# Patient Record
Sex: MALE | Race: OTHER | HISPANIC OR LATINO | URBAN - METROPOLITAN AREA
[De-identification: names, ages, dates, MRNs, and addresses within clinical notes are randomized per-mention and may not be internally consistent; named-entity substitution may affect disease eponyms.]

---

## 2018-04-11 ENCOUNTER — EMERGENCY (EMERGENCY)
Facility: HOSPITAL | Age: 23
LOS: 1 days | Discharge: HOME | End: 2018-04-11
Attending: EMERGENCY MEDICINE | Admitting: EMERGENCY MEDICINE
Payer: COMMERCIAL

## 2018-04-11 VITALS
DIASTOLIC BLOOD PRESSURE: 78 MMHG | OXYGEN SATURATION: 100 % | HEART RATE: 79 BPM | TEMPERATURE: 98 F | SYSTOLIC BLOOD PRESSURE: 143 MMHG | RESPIRATION RATE: 15 BRPM

## 2018-04-11 PROBLEM — Z00.00 ENCOUNTER FOR PREVENTIVE HEALTH EXAMINATION: Status: ACTIVE | Noted: 2018-04-11

## 2018-04-11 PROCEDURE — 99282 EMERGENCY DEPT VISIT SF MDM: CPT | Mod: 25

## 2018-04-11 NOTE — ED PROVIDER NOTE - MEDICAL DECISION MAKING DETAILS
22 year old male with left foot pain. The area was explored with no signs of foreign bodies. The patient is able to ambulate. There is mild tenderness to palpation of the plantar aspect of the left foot. Will d/c home with the recommendation to elevate the leg, use orthotic in shoes, ice to decrease inflammation, and follow up with primary care.

## 2018-04-11 NOTE — ED ADULT TRIAGE NOTE - CHIEF COMPLAINT QUOTE
"I stepped on something and I think it's in my foot." 6 days ago pt walking around [place], states stepped on something sharp that went though his shoe and into his left foot, presents today for continued pain 2/10

## 2018-04-11 NOTE — ED PROVIDER NOTE - OBJECTIVE STATEMENT
22 year old male with no significant PMHx, presents with left foot pain x 6 days. The patient states that he first noticed his foot pain on Friday of last week, stating that he went to a club on Thursday and possibly stepped on something. His pain is localized to the plantar aspect of his foot. Patient is able to ambulate with mild pain. He notes that he was wearing sneakers at the time. 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 He denies fever, chills, nausea, vomiting, weakness, numbness, tingling, or any other complaint.

## 2019-10-28 ENCOUNTER — APPOINTMENT (OUTPATIENT)
Dept: ORTHOPEDIC SURGERY | Facility: CLINIC | Age: 24
End: 2019-10-28

## 2019-11-01 ENCOUNTER — APPOINTMENT (OUTPATIENT)
Dept: ORTHOPEDIC SURGERY | Facility: CLINIC | Age: 24
End: 2019-11-01
Payer: MEDICAID

## 2019-11-01 VITALS — WEIGHT: 240 LBS | BODY MASS INDEX: 30.8 KG/M2 | HEIGHT: 74 IN

## 2019-11-01 DIAGNOSIS — Z78.9 OTHER SPECIFIED HEALTH STATUS: ICD-10-CM

## 2019-11-01 PROCEDURE — 99203 OFFICE O/P NEW LOW 30 MIN: CPT

## 2019-11-01 PROCEDURE — 73564 X-RAY EXAM KNEE 4 OR MORE: CPT | Mod: RT

## 2019-11-01 NOTE — HISTORY OF PRESENT ILLNESS
[de-identified] : CC RIGHT knee\par \par HPI  MARK WILDE  , 24 year old M LHD presents with acute onset of 4 months of Constant pain in the Medial RIGHT knee with hyperextension injury playing kickball. The pain is better and rated a 3# out of 10, described as Aching without radiation. Rest makes the pain better and stairs sit-stand  makes the pain worse. The patient reports associated symptoms of Clicking/Grinding/Locking/Instability/Swelling.  The patient denies pain at night affecting sleep, and denies similar pain previously.\par \par The patient has tried the following treatments:\par Activity modification	+ \par Ice/Compression  	                +\par Braces    		                -\par Nsaids    		                + \par Physical Therapy  	                -\par Cortisone Injection	                -\par Visco Injection		-\par Arthroscopy		-\par \par Review of Systems is positive for the above musculoskeletal symptoms and is otherwise non-contributory for general, constitutional, psychiatric, neurologic, HEENT, cardiac, respiratory, gastrointestinal, reproductive, lymphatic, and dermatologic complaints.\par

## 2019-11-01 NOTE — DISCUSSION/SUMMARY
[de-identified] : Right knee internal derangement suspect possible medial meniscus tear and possible ACL strain/tear\par \par I discussed the findings and history exam and radiology the patient.\par \par Patient currently has [facility] Medicaid without outpatient coverage. He recently was covered under a [facility] plus he believes that he is not sure he does not have a card yet\par \par Given this I recommend the patient continued knee exercises\par \par Anti-inflammatory pain medicine as needed\par \par Patient is given contact information for the orthopedic clinic where Medicaid patient can receive MRIs and surgery.\par \par The patient is covered under an HMO insurance the patient will follow with me for further evaluation for consideration of advance imaging

## 2019-11-01 NOTE — PHYSICAL EXAM
[de-identified] : \par Physical Examination\par General: well nourished, in no acute distress, alert and oriented to person, place and time\par Psychiatric: normal mood and affect, no abnormal movements or speech patterns\par Eyes: vision intact Without glasses\par Throat: no thyromegaly\par Lymph: no enlarged nodes, no lymphedema in extremity\par Respiratory: no wheezing, no shortness of breath with ambulation\par Cardiac: no cardiac leg swelling, 2+ peripheral pulses\par Neurology: normal gross sensation in extremities to light touch\par Abdomen: soft, non-tender, tympanic, no masses\par \par Musculoskeletal Examination\par Ambulation	- antalgic gait, - assistive devices\par \par Knee			Right			Left\par General\par      Swelling/Deformity	normal			normal	\par      Skin			normal			normal\par      Erythema		-			-\par      Standing Alignment	neutral			neutral\par      Effusion		small moderate			none\par Range of Motion\par      Hip			full painless ROM		full painless ROM\par      Knee Flexion		130			130\par      Knee Extension	0			0\par Patella\par      J Sign		-			-\par      Quad Medial/Lateral	1/1 1/1\par      Apprehension		-			-\par      Rashel's		-			-\par      Grind Sign		-			-\par      Crepitus		-			-\par Palpation\par      Medial Joint Line	+			-\par      Medial Fem Condyle	-			-\par      Lateral Joint Line	-			-\par      Quad Tendon		-			-\par      Patella Tendon	-			-\par      Medial Patella		-			-\par      Lateral Patella 	-			-\par      Posterior Knee	-			-\par Ligamentous\par      Varus @ 0° / 30°	-/-			-/-\par      Valgus @ 0° / 30°	-/-			-/-\par      Lachman		2A			-\par      Pivot Shift		-limited			-\par      Anterior Drawer	-			-\par      Posterior Drawer	-			-\par Meniscus\par      Jennie		+ medial pain			-\par      Flexion Pinch		+Anterior			-\par Strength Examination/Atrophy\par      Hip Flexors 		5+			5+\par      Quadriceps		5+			5+\par      Hamstring		5+			5+\par      Tibialis Anterior	5+			5+\par      Achilles/Soleus	5+			5+\par Sensation\par      Deep Peroneal	normal			normal\par      Superficial Peroneal 	normal			normal\par      Sural  		normal			normal\par      Posterior Tibial 	normal			normal\par      Saphneous 		normal			normal\par Pulses\par      DP			2+			2+\par \par  [de-identified] : 5 views of the affected Right knee (standing AP, flexing standing AP, 30degree flexed lateral, 0degree lateral, sunrise view)\par were ordered, obtained and evaluated by myself today and\par demonstrate:\par There is no narrowing\par Trace osteophytic lipping\par Small suprapatellar effusion\par no patellofemoral joint space loss without evidence of tilt [or] subluxation on sunrise view\par Normal soft tissue density\par Otherwise normal osseous bone structure without fracture or dislocation

## 2019-12-04 ENCOUNTER — APPOINTMENT (OUTPATIENT)
Dept: ORTHOPEDIC SURGERY | Facility: CLINIC | Age: 24
End: 2019-12-04
Payer: MEDICAID

## 2019-12-04 PROCEDURE — 99214 OFFICE O/P EST MOD 30 MIN: CPT

## 2019-12-04 RX ORDER — DICLOFENAC SODIUM 50 MG/1
50 TABLET, DELAYED RELEASE ORAL
Qty: 60 | Refills: 1 | Status: ACTIVE | COMMUNITY
Start: 2019-12-04 | End: 1900-01-01

## 2019-12-04 NOTE — DISCUSSION/SUMMARY
[de-identified] : Right knee internal derangement suspect possible medial meniscus tear and possible ACL strain/tear\par Right knee patellofemoral syndrome\par \par I discussed my findings on history, exam and radiology.\par \par I reviewed the anatomy and function of the periarticular muscles and tendons, patella, ligaments, menisci and cartilage of the knee using models, images and diagrams. Given the current findings for the patient, I recommend proceeding with advanced imaging to better characterize and diagnose the problem to aid patient care, management and treatment guidance as I suspect an internal injury to the knee not diagnosed on non-diagnostic plain radiographs causing the patients symptoms and physical examination to help provide surgical management planning.\par \par Therefore given the patients continued symptoms despite 6 weeks of home exercises, anti-inflammatory medication, activity modification with continued pain affecting ADLs and inability to do activities limiting quality of life as well as locking and instability significant for patient falls potentially placing the patient at increased risk for exacerbating the injury or causing further injury to the knee, an examination and history consistent with injury to an internal knee derangement and a non-diagnostic radiograph I recommend obtaining an MRI to assess the knee's internal structures for preoperative planning. I discussed with the patient that I am ordering an MRI to assess the soft tissue structures in the joint such as ligaments and tendons, as well as to assess the cartilage and menisci as well as for any bony edema. The test will need insurance approval and will take place at a [facility] facility or outside facility. If the patient elects to obtain the MRI from an outside facility, the patient understands they have been instructed to bring in both the final radiologist read and a CD/DVD with the MRI images to allow review of the imaging test by myself during the follow up visit. I do not recommend obtaining an open standing MRI as the quality of the images is subpar and makes it difficult to diagnose intra-articular derangements and guide care discussion and decision making.\par The patient was prescribed Diclofenac PO non-steroidal anti-inflammatory medication. 50mg tablets twice daily to be taken for at least 1-2 weeks in a row and then PRN afterwards. Risks and benefits were discussed and include but not limited to renal damage and GI ulceration and bleeding.  They were advised to take with food to limit stomach upset as well as warned to stop the medication if worsening gastric pain or dizziness or other side effects. Also to immediately stop the medication and seek appropriate medical attention if any severe stomach ache, gastritis, black/red vomit, black/red stools or any other medical concern.\par \par The patient verifies their understanding the the visit, diagnosis and plan. They agree with the treatment plan and will contact the office with any questions or problems.\par \par FU after MRI is obtained

## 2019-12-04 NOTE — PHYSICAL EXAM
[de-identified] : \par Physical Examination\par General: well nourished, in no acute distress, alert and oriented to person, place and time\par Psychiatric: normal mood and affect, no abnormal movements or speech patterns\par Eyes: vision intact Without glasses\par Throat: no thyromegaly\par Lymph: no enlarged nodes, no lymphedema in extremity\par Respiratory: no wheezing, no shortness of breath with ambulation\par Cardiac: no cardiac leg swelling, 2+ peripheral pulses\par Neurology: normal gross sensation in extremities to light touch\par Abdomen: soft, non-tender, tympanic, no masses\par \par Musculoskeletal Examination\par Ambulation	- antalgic gait, - assistive devices\par \par Knee			Right			Left\par General\par      Swelling/Deformity	normal			normal	\par      Skin			normal			normal\par      Erythema		-			-\par      Standing Alignment	neutral			neutral\par      Effusion		trace			none\par Range of Motion\par      Hip			full painless ROM		full painless ROM\par      Knee Flexion		130			130\par      Knee Extension	0			0\par Patella\par      J Sign		-			-\par      Quad Medial/Lateral	1/1 1/1\par      Apprehension		-			-\par      Rashel's		-			-\par      Grind Sign		-			-\par      Crepitus		+			-\par Palpation\par      Medial Joint Line	+			-\par      Medial Fem Condyle	-			-\par      Lateral Joint Line	-			-\par      Quad Tendon		-			-\par      Patella Tendon	-			-\par      Medial Patella		-			-\par      Lateral Patella 	-			-\par      Posterior Knee	-			-\par Ligamentous\par      Varus @ 0° / 30°	-/-			-/-\par      Valgus @ 0° / 30°	-/-			-/-\par      Lachman		2A			-\par      Pivot Shift		-limited			-\par      Anterior Drawer	-			-\par      Posterior Drawer	-			-\par Meniscus\par      Jennie		+ medial pain			-\par      Flexion Pinch		+ Anterior			-\par Strength Examination/Atrophy\par      Hip Flexors 		5+			5+\par      Quadriceps		5+			5+\par      Hamstring		5+			5+\par      Tibialis Anterior	5+			5+\par      Achilles/Soleus	5+			5+\par Sensation\par      Deep Peroneal	normal			normal\par      Superficial Peroneal 	normal			normal\par      Sural  		normal			normal\par      Posterior Tibial 	normal			normal\par      Saphneous 		normal			normal\par Pulses\par      DP			2+			2+\par \par  [de-identified] : 5 views of the affected Right knee (standing AP, flexing standing AP, 30degree flexed lateral, 0degree lateral, sunrise view)\par 11-1-19\par demonstrate:\par There is no narrowing\par Trace osteophytic lipping\par Small suprapatellar effusion\par no patellofemoral joint space loss without evidence of tilt [or] subluxation on sunrise view\par Normal soft tissue density\par Otherwise normal osseous bone structure without fracture or dislocation

## 2019-12-04 NOTE — HISTORY OF PRESENT ILLNESS
[de-identified] : CC RIGHT knee\par \par HPI  MARK WILDE  , 24 year old M LHD presents for 6 weeks home exercises FU of acute onset of 4 months of Constant pain in the Medial RIGHT knee with hyperextension injury playing kickball. The pain is better and rated a 3# out of 10, described as Aching without radiation. Rest makes the pain better and stairs sit-stand  makes the pain worse. The patient reports associated symptoms of Clicking/Grinding/Locking/Instability/Swelling.  The patient denies pain at night affecting sleep, and denies similar pain previously.\par \par The patient has tried the following treatments:\par Activity modification	+ \par Ice/Compression  	                +\par Braces    		                -\par Nsaids    		                + \par Physical Therapy  	                6 weeks homeexercises\par Cortisone Injection	                -\par Visco Injection		-\par Arthroscopy		-\par \par still has pain wants mri\par \par Review of Systems is positive for the above musculoskeletal symptoms and is otherwise non-contributory for general, constitutional, psychiatric, neurologic, HEENT, cardiac, respiratory, gastrointestinal, reproductive, lymphatic, and dermatologic complaints.\par

## 2019-12-30 ENCOUNTER — APPOINTMENT (OUTPATIENT)
Dept: ORTHOPEDIC SURGERY | Facility: CLINIC | Age: 24
End: 2019-12-30

## 2020-01-03 ENCOUNTER — APPOINTMENT (OUTPATIENT)
Dept: ORTHOPEDIC SURGERY | Facility: CLINIC | Age: 25
End: 2020-01-03
Payer: MEDICAID

## 2020-01-03 DIAGNOSIS — S83.271A COMPLEX TEAR OF LATERAL MENISCUS, CURRENT INJURY, RIGHT KNEE, INITIAL ENCOUNTER: ICD-10-CM

## 2020-01-03 DIAGNOSIS — S83.241A OTHER TEAR OF MEDIAL MENISCUS, CURRENT INJURY, RIGHT KNEE, INITIAL ENCOUNTER: ICD-10-CM

## 2020-01-03 DIAGNOSIS — S83.511A SPRAIN OF ANTERIOR CRUCIATE LIGAMENT OF RIGHT KNEE, INITIAL ENCOUNTER: ICD-10-CM

## 2020-01-03 DIAGNOSIS — M95.8 OTHER SPECIFIED ACQUIRED DEFORMITIES OF MUSCULOSKELETAL SYSTEM: ICD-10-CM

## 2020-01-03 PROCEDURE — 99213 OFFICE O/P EST LOW 20 MIN: CPT

## 2020-01-03 NOTE — DISCUSSION/SUMMARY
[de-identified] : Right knee medial meniscus peripheral vertical meniscal capsular junction tear\par Right knee lateral femoral condyle 1 x 1.5 cm osteochondral lesion with subchondral cysts and bone marrow edema\par Right knee posterior horn lateral meniscus tear\par Right knee chronic low-grade ACL tear\par \par \par I discussed the nature of meniscal tears using models, diagrams and drawings as well as the mensci's function. The meniscus is a fibrocartilage that cushions and spreads the contact stresses between the femur and the tibia. It becomes less pliable and more prone to tearing with age. The torn meniscus can be painful. We discussed both the risks and benefits of both operative versus non-operative treatment. Non-operative treatment including activity modification, oral NSAIDS, therapy and corticosteroid injections can also be attempted. In patient failing non-operative conservative treatment, the definitive surgical treatment, depending upon manypatient factors, including but not limited to age, activity level, tear acuity, tear pattern, tissue quality, etc, is arthroscopic debridement versus repair. However, certain a certain subset of patients, they are candidates for a meniscal repair which should be done expediently to maximize reparability of the torn meniscus. There is a chance of progression of knee degenerative arthrosis with a meniscus tear due to the loss of function of the meniscus. Unfortunately there is frequently superimposed degenerative chondromalacia of the chondral surfaces in the knee. These symptoms are frequently not experienced preoperatively or are in addition to the meniscal symptoms. It is very difficult to differentiate the two clinically based on imaging studies, physical exam and history, therefore there are no guarantees as to the outcome, that ultimate improvement is largely based on the extent of degenerative chondromalacia present as well as the extent of meniscal pathology. The patient was instructed to avoid deep knee bends or squatting as this may exacerbate the knee's internal derangement.\par \par I discussed the significance of the significant lateral femoral condyle osteochondral lesion with evidence of chronic changes within the underlying. I discussed surgical management and nonoperative management. Given the patient's lack of symptoms at this moment I recommend the patient continue with nonoperative management.\par \par The patient is any pain I recommend immediate evaluation for possible surgical management. Did discuss possible prophylactic management at this time and this is not amenable.\par \par The patient verifies their understanding the the visit, diagnosis and plan. They agree with the treatment plan and will contact the office with any questions or problems.\par \par Follow up\par P.r.n.

## 2020-01-03 NOTE — HISTORY OF PRESENT ILLNESS
[de-identified] : CC RIGHT knee\par \par HPI  MARK WILDE  , 24 year old M LHD presents for MRI results FU of acute onset of 4 months of Constant pain in the Medial RIGHT knee with hyperextension injury playing kickball. The pain is better and rated a 3# out of 10, described as Aching without radiation. Rest makes the pain better and stairs sit-stand  makes the pain worse. The patient reports associated symptoms of Clicking/Grinding/Locking/Instability/Swelling.  The patient denies pain at night affecting sleep, and denies similar pain previously.\par \par The patient has tried the following treatments:\par Activity modification	+ \par Ice/Compression  	                +\par Braces    		                -\par Nsaids    		                + \par Physical Therapy  	                6 weeks homeexercises\par Cortisone Injection	                -\par Visco Injection		-\par Arthroscopy		-\par \par pain almost resolved, able to run without pain\par \par Review of Systems is positive for the above musculoskeletal symptoms and is otherwise non-contributory for general, constitutional, psychiatric, neurologic, HEENT, cardiac, respiratory, gastrointestinal, reproductive, lymphatic, and dermatologic complaints.\par

## 2020-01-03 NOTE — PHYSICAL EXAM
[de-identified] : \par Physical Examination\par General: well nourished, in no acute distress, alert and oriented to person, place and time\par Psychiatric: normal mood and affect, no abnormal movements or speech patterns\par Eyes: vision intact Without glasses\par Throat: no thyromegaly\par Lymph: no enlarged nodes, no lymphedema in extremity\par Respiratory: no wheezing, no shortness of breath with ambulation\par Cardiac: no cardiac leg swelling, 2+ peripheral pulses\par Neurology: normal gross sensation in extremities to light touch\par Abdomen: soft, non-tender, tympanic, no masses\par \par Musculoskeletal Examination\par Ambulation	- antalgic gait, - assistive devices\par \par Knee			Right			Left\par General\par      Swelling/Deformity	normal			normal	\par      Skin			normal			normal\par      Erythema		-			-\par      Standing Alignment	neutral			neutral\par      Effusion		trace			none\par Range of Motion\par      Hip			full painless ROM		full painless ROM\par      Knee Flexion		130			130\par      Knee Extension	0			0\par Patella\par      J Sign		-			-\par      Quad Medial/Lateral	1/1 1/1\par      Apprehension		-			-\par      Rashel's		-			-\par      Grind Sign		-			-\par      Crepitus		+			-\par Palpation\par      Medial Joint Line	+			-\par      Medial Fem Condyle	-			-\par      Lateral Joint Line	-			-\par      Quad Tendon		-			-\par      Patella Tendon	-			-\par      Medial Patella		-			-\par      Lateral Patella 	-			-\par      Posterior Knee	-			-\par Ligamentous\par      Varus @ 0° / 30°	-/-			-/-\par      Valgus @ 0° / 30°	-/-			-/-\par      Lachman		2A			-\par      Pivot Shift		-limited			-\par      Anterior Drawer	-			-\par      Posterior Drawer	-			-\par Meniscus\par      Jennie		+ mild medial pain			-\par      Flexion Pinch		+ Anterior			-\par Strength Examination/Atrophy\par      Hip Flexors 		5+			5+\par      Quadriceps		5+			5+\par      Hamstring		5+			5+\par      Tibialis Anterior	5+			5+\par      Achilles/Soleus	5+			5+\par Sensation\par      Deep Peroneal	normal			normal\par      Superficial Peroneal 	normal			normal\par      Sural  		normal			normal\par      Posterior Tibial 	normal			normal\par      Saphneous 		normal			normal\par Pulses\par      DP			2+			2+\par \par  [de-identified] : 5 views of the affected Right knee (standing AP, flexing standing AP, 30degree flexed lateral, 0degree lateral, sunrise view)\par 11-1-19\par demonstrate:\par There is no narrowing\par Trace osteophytic lipping\par Small suprapatellar effusion\par no patellofemoral joint space loss without evidence of tilt [or] subluxation on sunrise view\par Normal soft tissue density\par Otherwise normal osseous bone structure without fracture or dislocation\par \par \par MRI Right knee from [facility] on 12-6-19\par My impression of the images:\par Quality of the MRI is good\par Medial Meniscus peripheral vertical tear of the meniscus from the body to the posterior horn at the meniscal capsular junction\par Lateral Meniscus significant truncation of the posterior horn\par There is focal moderate to severe chondral loss in the lateral femoral condyle compartments\par There is bone marrow edema[/subchondral cysts] in the lateral femoral condyle compartments\par LCL is intact\par MCL is intact\par ACL is intact but attenuated\par PCL is intact \par Quadriceps Tendon is intact\par Patella Tendon is intact\par \par The Final Radiologist Impression:\par SOFT TISSUES: Unremarkable.\par BONE MARROW: No fracture or avascular necrosis.\par Menisci : Longitudinal vertical tear through the peripheral red zone of the posterior horn\par and body of the medial meniscus (sagittal 30, coronal 13). Radial tear posterior horn\par lateral meniscus (sagittal 12).\par ANTERIOR CRUCIATE LIGAMENT: Evidence of a chronic partial thickness tear of the ACL.\par POSTERIOR CRUCIATE LIGAMENT: Normal.\par EXTENSOR MECHANISM:\par PATELLA: Mild lateral patellar tilt and subluxation.\par Hoffa's fat-pad: Within normal limits.\par Distal quadriceps and patellar tendon: Normal.\par PATELLOFEMORAL LIGAMENTS AND RETINACULA: Normal.\par MEDIAL STRUCTURES:\par MCL: Superficial tibial collateral ligament, deep meniscal femoral, and meniscal tibial\par ligaments are intact.\par PES ANSERINE TENDONS AND DISTAL SEMIMEMBRANOSUS: Unremarkable.\par lateral structures:\par ILIOTIBIAL BAND: Unremarkable.\par BICEPS FEMORIS TENDON: Unremarkable.\par FIBULAR COLLATERAL LIGAMENT: Unremarkable.\par POPLITEUS MUSCLE AND TENDON: Unremarkable.\par Posterior lateral corner structures Unremarkable.\par popliteal cyst: None.\par periarticular bursitis: None.\par JOINT EFFUSION: Small joint effusion.\par CARTILAGE: Focal high-grade cartilage defect mid lateral femoral condylar cartilage\par measuring 10 x 13.5 mm (coronal 11, sagittal 11) with subchondral cystic changes and\par edema. The remainder the cartilage is unremarkable.\par MUSCLES: Within normal limits. No edema or atrophy.\par Nerves and vasculature: Unremarkable.\par IMPRESSION:\par Longitudinal vertical tear through the peripheral red zone of the posterior horn and body\par the medial meniscus.\par Radial tear posterior horn lateral meniscus.\par Chronic partial-thickness tear of the ACL.\par Small joint effusion.\par Focal high-grade 10 x 13.5 mm cartilage lesion mid lateral femoral condyle subchondral\par edema and cystic changes.

## 2023-10-19 ENCOUNTER — APPOINTMENT (OUTPATIENT)
Dept: GASTROENTEROLOGY | Facility: HOSPITAL | Age: 28
End: 2023-10-19

## 2024-05-26 ENCOUNTER — EMERGENCY (EMERGENCY)
Facility: HOSPITAL | Age: 29
LOS: 1 days | Discharge: HOME | End: 2024-05-26
Attending: EMERGENCY MEDICINE | Admitting: EMERGENCY MEDICINE
Payer: MEDICAID

## 2024-05-26 VITALS
DIASTOLIC BLOOD PRESSURE: 86 MMHG | SYSTOLIC BLOOD PRESSURE: 120 MMHG | TEMPERATURE: 97 F | OXYGEN SATURATION: 97 % | RESPIRATION RATE: 16 BRPM | HEART RATE: 78 BPM

## 2024-05-26 VITALS
WEIGHT: 265 LBS | HEIGHT: 75 IN | HEART RATE: 81 BPM | RESPIRATION RATE: 24 BRPM | OXYGEN SATURATION: 97 % | TEMPERATURE: 98 F | SYSTOLIC BLOOD PRESSURE: 136 MMHG | DIASTOLIC BLOOD PRESSURE: 81 MMHG

## 2024-05-26 LAB
ALBUMIN SERPL ELPH-MCNC: 4.4 G/DL — SIGNIFICANT CHANGE UP (ref 3.3–5)
ALP SERPL-CCNC: 99 U/L — SIGNIFICANT CHANGE UP (ref 40–120)
ALT FLD-CCNC: 43 U/L — HIGH (ref 4–41)
ANION GAP SERPL CALC-SCNC: 18 MMOL/L — HIGH (ref 7–14)
AST SERPL-CCNC: 31 U/L — SIGNIFICANT CHANGE UP (ref 4–40)
BASOPHILS # BLD AUTO: 0.04 K/UL — SIGNIFICANT CHANGE UP (ref 0–0.2)
BASOPHILS NFR BLD AUTO: 0.3 % — SIGNIFICANT CHANGE UP (ref 0–2)
BILIRUB SERPL-MCNC: 0.7 MG/DL — SIGNIFICANT CHANGE UP (ref 0.2–1.2)
BUN SERPL-MCNC: 20 MG/DL — SIGNIFICANT CHANGE UP (ref 7–23)
CALCIUM SERPL-MCNC: 9.2 MG/DL — SIGNIFICANT CHANGE UP (ref 8.4–10.5)
CHLORIDE SERPL-SCNC: 104 MMOL/L — SIGNIFICANT CHANGE UP (ref 98–107)
CO2 SERPL-SCNC: 16 MMOL/L — LOW (ref 22–31)
CREAT SERPL-MCNC: 1.02 MG/DL — SIGNIFICANT CHANGE UP (ref 0.5–1.3)
EGFR: 101 ML/MIN/1.73M2 — SIGNIFICANT CHANGE UP
EOSINOPHIL # BLD AUTO: 0.06 K/UL — SIGNIFICANT CHANGE UP (ref 0–0.5)
EOSINOPHIL NFR BLD AUTO: 0.4 % — SIGNIFICANT CHANGE UP (ref 0–6)
GLUCOSE SERPL-MCNC: 118 MG/DL — HIGH (ref 70–99)
HCT VFR BLD CALC: 38 % — LOW (ref 39–50)
HGB BLD-MCNC: 13 G/DL — SIGNIFICANT CHANGE UP (ref 13–17)
IANC: 10.97 K/UL — HIGH (ref 1.8–7.4)
IMM GRANULOCYTES NFR BLD AUTO: 0.3 % — SIGNIFICANT CHANGE UP (ref 0–0.9)
LYMPHOCYTES # BLD AUTO: 1.41 K/UL — SIGNIFICANT CHANGE UP (ref 1–3.3)
LYMPHOCYTES # BLD AUTO: 10.6 % — LOW (ref 13–44)
MCHC RBC-ENTMCNC: 28.5 PG — SIGNIFICANT CHANGE UP (ref 27–34)
MCHC RBC-ENTMCNC: 34.2 GM/DL — SIGNIFICANT CHANGE UP (ref 32–36)
MCV RBC AUTO: 83.3 FL — SIGNIFICANT CHANGE UP (ref 80–100)
MONOCYTES # BLD AUTO: 0.84 K/UL — SIGNIFICANT CHANGE UP (ref 0–0.9)
MONOCYTES NFR BLD AUTO: 6.3 % — SIGNIFICANT CHANGE UP (ref 2–14)
NEUTROPHILS # BLD AUTO: 10.97 K/UL — HIGH (ref 1.8–7.4)
NEUTROPHILS NFR BLD AUTO: 82.1 % — HIGH (ref 43–77)
NRBC # BLD: 0 /100 WBCS — SIGNIFICANT CHANGE UP (ref 0–0)
NRBC # FLD: 0 K/UL — SIGNIFICANT CHANGE UP (ref 0–0)
PLATELET # BLD AUTO: 327 K/UL — SIGNIFICANT CHANGE UP (ref 150–400)
POTASSIUM SERPL-MCNC: 4.4 MMOL/L — SIGNIFICANT CHANGE UP (ref 3.5–5.3)
POTASSIUM SERPL-SCNC: 4.4 MMOL/L — SIGNIFICANT CHANGE UP (ref 3.5–5.3)
PROT SERPL-MCNC: 7.6 G/DL — SIGNIFICANT CHANGE UP (ref 6–8.3)
RBC # BLD: 4.56 M/UL — SIGNIFICANT CHANGE UP (ref 4.2–5.8)
RBC # FLD: 13.9 % — SIGNIFICANT CHANGE UP (ref 10.3–14.5)
SODIUM SERPL-SCNC: 138 MMOL/L — SIGNIFICANT CHANGE UP (ref 135–145)
WBC # BLD: 13.36 K/UL — HIGH (ref 3.8–10.5)
WBC # FLD AUTO: 13.36 K/UL — HIGH (ref 3.8–10.5)

## 2024-05-26 PROCEDURE — 73030 X-RAY EXAM OF SHOULDER: CPT | Mod: 26,RT

## 2024-05-26 PROCEDURE — 99284 EMERGENCY DEPT VISIT MOD MDM: CPT | Mod: 25

## 2024-05-26 PROCEDURE — 73030 X-RAY EXAM OF SHOULDER: CPT | Mod: 26,RT,77

## 2024-05-26 PROCEDURE — 73020 X-RAY EXAM OF SHOULDER: CPT | Mod: 26,RT,77

## 2024-05-26 PROCEDURE — 99152 MOD SED SAME PHYS/QHP 5/>YRS: CPT

## 2024-05-26 PROCEDURE — 73060 X-RAY EXAM OF HUMERUS: CPT | Mod: 26,RT

## 2024-05-26 PROCEDURE — 73020 X-RAY EXAM OF SHOULDER: CPT | Mod: 26,59,RT

## 2024-05-26 RX ORDER — HYDROMORPHONE HYDROCHLORIDE 2 MG/ML
1 INJECTION INTRAMUSCULAR; INTRAVENOUS; SUBCUTANEOUS ONCE
Refills: 0 | Status: DISCONTINUED | OUTPATIENT
Start: 2024-05-26 | End: 2024-05-26

## 2024-05-26 RX ORDER — PROPOFOL 10 MG/ML
90 INJECTION, EMULSION INTRAVENOUS ONCE
Refills: 0 | Status: COMPLETED | OUTPATIENT
Start: 2024-05-26 | End: 2024-05-26

## 2024-05-26 RX ORDER — PROPOFOL 10 MG/ML
200 INJECTION, EMULSION INTRAVENOUS ONCE
Refills: 0 | Status: COMPLETED | OUTPATIENT
Start: 2024-05-26 | End: 2024-05-26

## 2024-05-26 RX ORDER — KETOROLAC TROMETHAMINE 30 MG/ML
15 SYRINGE (ML) INJECTION ONCE
Refills: 0 | Status: DISCONTINUED | OUTPATIENT
Start: 2024-05-26 | End: 2024-05-26

## 2024-05-26 RX ORDER — LIDOCAINE 4 G/100G
1 CREAM TOPICAL ONCE
Refills: 0 | Status: COMPLETED | OUTPATIENT
Start: 2024-05-26 | End: 2024-05-26

## 2024-05-26 RX ORDER — MORPHINE SULFATE 50 MG/1
4 CAPSULE, EXTENDED RELEASE ORAL ONCE
Refills: 0 | Status: DISCONTINUED | OUTPATIENT
Start: 2024-05-26 | End: 2024-05-26

## 2024-05-26 RX ADMIN — PROPOFOL 200 MILLIGRAM(S): 10 INJECTION, EMULSION INTRAVENOUS at 07:00

## 2024-05-26 RX ADMIN — PROPOFOL 90 MILLIGRAM(S): 10 INJECTION, EMULSION INTRAVENOUS at 07:05

## 2024-05-26 RX ADMIN — MORPHINE SULFATE 4 MILLIGRAM(S): 50 CAPSULE, EXTENDED RELEASE ORAL at 01:39

## 2024-05-26 RX ADMIN — Medication 15 MILLIGRAM(S): at 01:39

## 2024-05-26 RX ADMIN — HYDROMORPHONE HYDROCHLORIDE 1 MILLIGRAM(S): 2 INJECTION INTRAMUSCULAR; INTRAVENOUS; SUBCUTANEOUS at 03:27

## 2024-05-26 RX ADMIN — Medication 15 MILLIGRAM(S): at 03:27

## 2024-05-26 RX ADMIN — MORPHINE SULFATE 4 MILLIGRAM(S): 50 CAPSULE, EXTENDED RELEASE ORAL at 02:27

## 2024-05-26 RX ADMIN — LIDOCAINE 1 PATCH: 4 CREAM TOPICAL at 01:39

## 2024-05-26 NOTE — ED PROVIDER NOTE - PATIENT PORTAL LINK FT
You can access the FollowMyHealth Patient Portal offered by [facility] by registering at the following website: [email or web address]. By joining [employer]s FollowMyHealth portal, you will also be able to view your health information using other applications (apps) compatible with our system.

## 2024-05-26 NOTE — ED PROVIDER NOTE - NS ED ROS FT
Constitutional:  (-) fever, (-) chills, (-) lethargy  Eyes:  (-) eye pain (-) visual changes  ENMT: (-) nasal discharge, (-) sore throat. (-) neck pain or stiffness  Cardiac: (-) chest pain (-) palpitations  Respiratory:  (-) cough (-) respiratory distress.   GI:  (-) nausea (-) vomiting (-) diarrhea (-) abdominal pain.  :  (-) dysuria (-) frequency (-) burning.  MS:  (-) back pain (+) joint pain - R shoulder pain   Neuro:  (-) headache (-) numbness (-) tingling (-) focal weakness  Skin:  (-) rash  Except as documented in the HPI,  all other systems are negative

## 2024-05-26 NOTE — ED PROCEDURE NOTE - NS_POSTPROCCAREGUIDE_ED_ALL_ED
Patient is now fully awake, with vital signs and temperature stable, hydration is adequate, patients De’s  score is at baseline (or greater than 8), patient and escort has received  discharge education.

## 2024-05-26 NOTE — ED PROVIDER NOTE - CLINICAL SUMMARY MEDICAL DECISION MAKING FREE TEXT BOX
28 y M, no PMH, p/w 1-day onset of R shoulder pain after a mechanical fall. Patient reports that he was working on his car and tripped on a wire cord and landed on R side. No head injury, LOC, abdominal pain, chest pain. VS nonactionable. PE as noted above. No sign of head trauma, neck is supple, no chest wall tenderness, abdominal tenderness, normal respiratory effort, R shoulder ROM limited due to pain, neurovascular intact, no other sign of trauma noted. DDx include but not limited to R shoulder dislocation vs fx. Will get labs, pain meds, XR, reassess.

## 2024-05-26 NOTE — ED PROVIDER NOTE - NPI NUMBER (FOR SYSADMIN USE ONLY) :
On 03/21/2017 the patient signed an CINDY authorizing the release of nutrition records from Mary Song MD @ [facility] to [facility] for the purpose of continuing care. On 03/21/2017 I faxed this CINDY to [phone], sent the original to scanning and kept a copy in psychiatry until scanning completed/confirmed.            Masha Chacon LPN      [[id number]]

## 2024-05-26 NOTE — ED ADULT NURSE NOTE - OBJECTIVE STATEMENT
28 year old male brought to ED for shoulder pain. Patient alert and oriented times four. Patient ambulatory at baseline. pt c/o right shoulder pain s/p trip and fall landing onto right shoulder. pt was working on car, tripped on cord powering light.  denies heads trauma, LOC, phx. PMS intact RUE. pt placed in sling, with minimal improvement. pt appears extremely uncomfortable, charge nurse notified. Patient has limited range of motion to right shoulder  patient laying in semi fowlers position on the stretcher. patient denies shortness of breath, chest pain, nausea, vomiting, chill, fever. Patient normal sinus on the monitor. Respirations equal and adequate. Patients IV patent, no signs of infiltration. Safety measures in place, call bell within reach. Patient stable upon leaving the room. 28 year old male brought to ED for shoulder pain. Patient alert and oriented times four. Patient ambulatory at baseline. pt c/o right shoulder pain s/p trip and fall landing onto right shoulder. pt was working on car, tripped on cord powering light.  denies heads trauma, LOC, phx. PMS intact RUE. pt placed in sling, with minimal improvement. pt appears extremely uncomfortable, charge nurse notified. Patient has limited range of motion to right shoulder  patient laying in semi fowlers position on the stretcher. patient denies shortness of breath, chest pain, nausea, vomiting, chill, fever. Patient normal sinus on the monitor. Respirations equal and adequate. Patients 20 gauge left a.c  IV patent, no signs of infiltration placed by triage. Safety measures in place, call bell within reach. Patient stable upon leaving the room.

## 2024-05-26 NOTE — ED ADULT NURSE REASSESSMENT NOTE - NS ED NURSE REASSESS COMMENT FT1
Patient is AOx4, c/o right shoulder pain. Patient medicated per chart. Patient appears uncomfortable on stretcher. Ortho to consult patient. Comfort measures maintained. Bed in lowest position. Position of comfort maintained. Safety maintained.

## 2024-05-26 NOTE — ED PROVIDER NOTE - PROGRESS NOTE DETAILS
Bam PGY5: Repeat x-rays performed orthopedics with no acute intervention and recommended follow-up with Dr. maddox in 1 to 2 weeks.  Vital signs stable.  Will follow-up with Ortho.

## 2024-05-26 NOTE — ED PROVIDER NOTE - CARE PROVIDER_API CALL
Robby Alvarez  Orthopaedic Surgery  [address], [address]  [place] [address]  Phone: [phone]  Fax: [phone]  Follow Up Time: 7-10 Days

## 2024-05-26 NOTE — ED PROVIDER NOTE - NSFOLLOWUPINSTRUCTIONS_ED_ALL_ED_FT
Shoulder Dislocation  Three images of the anatomy of the shoulder. One is normal. The other two are dislocated.  A shoulder dislocation happens when the upper arm bone (humerus) moves out of the shoulder joint. The shoulder joint is the part of the shoulder where the humerus, shoulder blade (scapula), and collarbone (clavicle) meet.    What are the causes?  This condition is often caused by:  A fall.  A hard, direct hit to the shoulder.  A forceful movement of the shoulder.  What increases the risk?  You are more likely to develop this condition if you play sports.    What are the signs or symptoms?  The upper body showing a dislocated shoulder.  Symptoms of this condition include:  Deformity of the shoulder.  Severe pain.  Inability to move the shoulder.  Numbness, weakness, or tingling in your neck or down your arm.  Bruising or swelling around your shoulder.  How is this diagnosed?  This condition is diagnosed with a physical exam. After the exam, tests may be done to check for related problems. Tests may include:  X-ray. This may be done to check for broken bones.  MRI. This may be done to check for damage to the tissues around the shoulder.  Electromyogram. This may be done to check for nerve damage.  How is this treated?  This condition is treated with a procedure to place the humerus back in the joint. This procedure is called a reduction. There are two types of reduction:  Closed reduction. The humerus is placed back in the joint without surgery. The health care provider uses his or her hands to guide the bone back into place.  Open reduction. Surgery is done to place the humerus back in the joint. An open reduction may be recommended if:  You have a weak shoulder joint or weak ligaments.  You have had more than one shoulder dislocation.  The nerves or blood vessels around your shoulder have been damaged.  After reduction, your shoulder and arm will be placed in a brace or sling to prevent it from moving.    After the brace or sling is removed, you will have physical therapy to help improve the range of motion in your shoulder joint.    Follow these instructions at home:  Medicines    Take over-the-counter and prescription medicines only as told by your health care provider.  Ask your health care provider if the medicine prescribed to you:  Requires you to avoid driving or using machinery.  Can cause constipation. You may need to take these actions to prevent or treat constipation:  Drink enough fluid to keep your urine pale yellow.  Take over-the-counter or prescription medicines.  Eat foods that are high in fiber, such as beans, whole grains, and fresh fruits and vegetables.  Limit foods that are high in fat and processed sugars, such as fried or sweet foods.  If you have a brace or sling:    Wear the brace or sling as told by your health care provider. Remove it only as told by your health care provider.  Loosen the brace or sling if your fingers tingle, become numb, or turn cold and blue.  Keep the brace or sling clean.  If the brace or sling is not waterproof:  Do not let it get wet.  Cover it with a watertight covering when you take a bath or shower.  Managing pain, stiffness, and swelling    Bag of ice on a towel on the skin.  If directed, put ice on the injured area.  If you have a removable brace or sling, remove it as told by your health care provider.  Put ice in a plastic bag.  Place a towel between your skin and the bag.  Leave the ice on for 20 minutes, 2–3 times per day.  Move your fingers often to reduce stiffness and swelling.  Raise (elevate) the injured area above the level of your heart while you are sitting or lying down.  Activity    Do not lift your arm above shoulder level until your health care provider approves.  Do not lift anything until your health care provider says that it is safe.  Do not push or pull things until your health care provider approves.  Return to your normal activities as told by your health care provider. Ask your health care provider what activities are safe for you.  Perform range-of-motion exercises only as told by your health care provider.  Exercise your hand by squeezing a soft ball. This helps to decrease stiffness and swelling in your hand and wrist.  General instructions    Do not drive while wearing a brace or sling on a hand that you use for driving. Ask your health care provider when it is safe to drive after the brace or sling is removed.  Do not take baths, swim, or use a hot tub until your health care provider approves. Ask your health care provider if you may take showers. You may only be allowed to take sponge baths.  Do not use any products that contain nicotine or tobacco, such as cigarettes, e-cigarettes, and chewing tobacco. These can delay healing. If you need help quitting, ask your health care provider.  Keep all follow-up visits as told by your health care provider. This is important.  Contact a health care provider if:  Your brace or sling gets damaged.  Get help right away if:  Your pain gets worse rather than better.  You lose feeling in your arm or hand.  Your arm or hand becomes white and cold.  Summary  A shoulder dislocation happens when the upper arm bone moves out of the shoulder joint.  It is usually caused by a fall, a strong hit to the shoulder, or a forceful movement of the shoulder.  It causes severe pain, inability to move the shoulder, numbness, weakness, or tingling.  This condition is treated with either closed or open reduction. You will also be given a brace or sling. You will do exercises to increase your shoulder's range of motion.  Contact a health care provider if your brace or sling gets damaged. Get help right away if your pain gets worse, you lose feeling in your arm or hand, or your arm or hand becomes white or cold.  This information is not intended to replace advice given to you by your health care provider. Make sure you discuss any questions you have with your health care provider. PLEASE FOLOW UP IN 1-2 WEEKS WITH DR SALAZAR    Shoulder Dislocation  Three images of the anatomy of the shoulder. One is normal. The other two are dislocated.  A shoulder dislocation happens when the upper arm bone (humerus) moves out of the shoulder joint. The shoulder joint is the part of the shoulder where the humerus, shoulder blade (scapula), and collarbone (clavicle) meet.    What are the causes?  This condition is often caused by:  A fall.  A hard, direct hit to the shoulder.  A forceful movement of the shoulder.  What increases the risk?  You are more likely to develop this condition if you play sports.    What are the signs or symptoms?  The upper body showing a dislocated shoulder.  Symptoms of this condition include:  Deformity of the shoulder.  Severe pain.  Inability to move the shoulder.  Numbness, weakness, or tingling in your neck or down your arm.  Bruising or swelling around your shoulder.  How is this diagnosed?  This condition is diagnosed with a physical exam. After the exam, tests may be done to check for related problems. Tests may include:  X-ray. This may be done to check for broken bones.  MRI. This may be done to check for damage to the tissues around the shoulder.  Electromyogram. This may be done to check for nerve damage.  How is this treated?  This condition is treated with a procedure to place the humerus back in the joint. This procedure is called a reduction. There are two types of reduction:  Closed reduction. The humerus is placed back in the joint without surgery. The health care provider uses his or her hands to guide the bone back into place.  Open reduction. Surgery is done to place the humerus back in the joint. An open reduction may be recommended if:  You have a weak shoulder joint or weak ligaments.  You have had more than one shoulder dislocation.  The nerves or blood vessels around your shoulder have been damaged.  After reduction, your shoulder and arm will be placed in a brace or sling to prevent it from moving.    After the brace or sling is removed, you will have physical therapy to help improve the range of motion in your shoulder joint.    Follow these instructions at home:  Medicines    Take over-the-counter and prescription medicines only as told by your health care provider.  Ask your health care provider if the medicine prescribed to you:  Requires you to avoid driving or using machinery.  Can cause constipation. You may need to take these actions to prevent or treat constipation:  Drink enough fluid to keep your urine pale yellow.  Take over-the-counter or prescription medicines.  Eat foods that are high in fiber, such as beans, whole grains, and fresh fruits and vegetables.  Limit foods that are high in fat and processed sugars, such as fried or sweet foods.  If you have a brace or sling:    Wear the brace or sling as told by your health care provider. Remove it only as told by your health care provider.  Loosen the brace or sling if your fingers tingle, become numb, or turn cold and blue.  Keep the brace or sling clean.  If the brace or sling is not waterproof:  Do not let it get wet.  Cover it with a watertight covering when you take a bath or shower.  Managing pain, stiffness, and swelling    Bag of ice on a towel on the skin.  If directed, put ice on the injured area.  If you have a removable brace or sling, remove it as told by your health care provider.  Put ice in a plastic bag.  Place a towel between your skin and the bag.  Leave the ice on for 20 minutes, 2–3 times per day.  Move your fingers often to reduce stiffness and swelling.  Raise (elevate) the injured area above the level of your heart while you are sitting or lying down.  Activity    Do not lift your arm above shoulder level until your health care provider approves.  Do not lift anything until your health care provider says that it is safe.  Do not push or pull things until your health care provider approves.  Return to your normal activities as told by your health care provider. Ask your health care provider what activities are safe for you.  Perform range-of-motion exercises only as told by your health care provider.  Exercise your hand by squeezing a soft ball. This helps to decrease stiffness and swelling in your hand and wrist.  General instructions    Do not drive while wearing a brace or sling on a hand that you use for driving. Ask your health care provider when it is safe to drive after the brace or sling is removed.  Do not take baths, swim, or use a hot tub until your health care provider approves. Ask your health care provider if you may take showers. You may only be allowed to take sponge baths.  Do not use any products that contain nicotine or tobacco, such as cigarettes, e-cigarettes, and chewing tobacco. These can delay healing. If you need help quitting, ask your health care provider.  Keep all follow-up visits as told by your health care provider. This is important.  Contact a health care provider if:  Your brace or sling gets damaged.  Get help right away if:  Your pain gets worse rather than better.  You lose feeling in your arm or hand.  Your arm or hand becomes white and cold.  Summary  A shoulder dislocation happens when the upper arm bone moves out of the shoulder joint.  It is usually caused by a fall, a strong hit to the shoulder, or a forceful movement of the shoulder.  It causes severe pain, inability to move the shoulder, numbness, weakness, or tingling.  This condition is treated with either closed or open reduction. You will also be given a brace or sling. You will do exercises to increase your shoulder's range of motion.  Contact a health care provider if your brace or sling gets damaged. Get help right away if your pain gets worse, you lose feeling in your arm or hand, or your arm or hand becomes white or cold.  This information is not intended to replace advice given to you by your health care provider. Make sure you discuss any questions you have with your health care provider.

## 2024-05-26 NOTE — ED ADULT TRIAGE NOTE - CHIEF COMPLAINT QUOTE
pt c/o right shoulder pain s/p trip and fall landing onto right shoulder. pt was working on car, tripped on cord powering light. landed directly on right shoulde.r denies heads truama, LOC, phx. PMS intact RUE pt c/o right shoulder pain s/p trip and fall landing onto right shoulder. pt was working on car, tripped on cord powering light.  denies heads trauma, LOC, phx. PMS intact RUE pt c/o right shoulder pain s/p trip and fall landing onto right shoulder. pt was working on car, tripped on cord powering light.  denies heads trauma, LOC, phx. PMS intact RUE. pt placed in sling, with minimal improvement. pt appears extremely uncomfortable, charge nurse notified.

## 2024-05-26 NOTE — ED ADULT NURSE REASSESSMENT NOTE - NS ED NURSE REASSESS COMMENT FT1
Received rpt from AB Cortes. Pt. A&OX4, lying in bed comfortably. Awaiting results. States pain is improved with meds. Will continue to monitor. Received rpt from AB Cortes. Pt. A&OX4, lying in bed comfortably. Family at bedside. States he feels better. Vitals stable, respirations even and unlabore.d Awaiting xray results. Ortho team notified to come back and sign other consent form. Will reach out to team.

## 2024-05-26 NOTE — ED PROVIDER NOTE - ATTENDING CONTRIBUTION TO CARE
DR. FARR, ATTENDING MD-  I performed a face to face bedside interview with the patient regarding history of present illness, review of symptoms and past medical history. I completed an independent physical exam.  I have discussed the patient's plan of care with the resident.   Documentation as above in the note.    29 y/o male with r shoulder pain after trip and fall.  Distally nv intact, lmtd rom r shoulder.  Eval for fx, dislocation, sprain, contusion.  Obtain xr shoulder give pain med.

## 2024-05-26 NOTE — CONSULT NOTE ADULT - SUBJECTIVE AND OBJECTIVE BOX
28yMale c/o R shoulder pain  s/p slipping on extension cord. Patient denies head hit or LOC. Patient denies numbness or tingling in the RUE. Patient denies any other injuries.    ROS: 10 point review of systems otherwise negative unless noted in HPI    PMH:  No pertinent past medical history      PSH:  No significant past surgical history      AH:  No Known Allergies    Meds: See med rec    T(C): 36.8 (05-26-24 @ 07:57)  HR: 59 (05-26-24 @ 07:57)  BP: 131/82 (05-26-24 @ 07:57)  RR: 15 (05-26-24 @ 07:57)  SpO2: 97% (05-26-24 @ 07:57)  Wt(kg): --    Gen: NAD  Resp: Unlabored breathing  PE RUE:  Skin intact, + sulcus sign,   SILT radial/median/ulnar/axillary  +AIN/PIN/Ulnar/Radial/Musc/Median,   +elbow/wrist ROM,   shoulder ROM limited 2/2 pain,   +radial pulse, soft compartments.    Secondary:  No TTP over bony landmarks, SILT BL, ROM intact BL, distal pulses palpable.    Imaging:  XR demonstrating R shoulder dislocation    .sred    28yMale with R shoulder dislocation sp Reduction    pain control  NWB in sling  No acute orthopaedic intervention  Ortho to sign off  Please call if you have further question  Can follow up with Dr. Alvarez in 1-2 weeks or upon discharge

## 2024-05-26 NOTE — ED ADULT NURSE NOTE - CHIEF COMPLAINT QUOTE
pt c/o right shoulder pain s/p trip and fall landing onto right shoulder. pt was working on car, tripped on cord powering light.  denies heads trauma, LOC, phx. PMS intact RUE. pt placed in sling, with minimal improvement. pt appears extremely uncomfortable, charge nurse notified.

## 2024-05-26 NOTE — ED ADULT NURSE REASSESSMENT NOTE - NS ED NURSE REASSESS COMMENT FT1
report received from AB [place]. patient laying in semi fowlers position on the stretcher. patient alert and oriented times four. patient denies shortness of breath, chest pain, nausea, vomiting, chill, fever. Patient normal sinus on the monitor. Respirations equal and adequate. Patients IV patent, no signs of infiltration. Safety measures in place, call bell within reach. Patient stable upon leaving the room.

## 2024-05-26 NOTE — ED PROVIDER NOTE - PHYSICAL EXAMINATION
Gen: Patient is in moderate distress due to pain, AAOx3, able to ambulate without assistance  HEENT: NCAT, EOMI, PERRLA, normal conjunctiva, tongue midline, oral mucosa moist, neck is supple with no midline tenderness   Lung: CTAB, no respiratory distress, no wheezes/rhonchi/rales B/L, speaking in full sentences  CV: RRR, no murmurs, rubs or gallops, distal pulses 2+ b/l  Abd: soft, NT, ND, no guarding, no rigidity, no rebound tenderness  MSK: no visible deformities, ROM of R shoulder limited due to pain, otherwise ROM normal in UE/LE, R distal neurovascular intact, no back TTP  Neuro: No focal sensory or motor deficits  Skin: Warm, well perfused, no rash, no leg swelling  Psych: normal affect, calm

## 2024-05-28 ENCOUNTER — NON-APPOINTMENT (OUTPATIENT)
Age: 29
End: 2024-05-28

## 2024-06-02 NOTE — ED POST DISCHARGE NOTE - REASON FOR FOLLOW-UP
Other Telephone follow up  to see how patient feeling after receiving procedural sedation. Patient contact # [phone] "wrong #" .

## 2024-06-10 ENCOUNTER — NON-APPOINTMENT (OUTPATIENT)
Age: 29
End: 2024-06-10

## 2024-06-10 ENCOUNTER — APPOINTMENT (OUTPATIENT)
Dept: ORTHOPEDIC SURGERY | Facility: CLINIC | Age: 29
End: 2024-06-10
Payer: MEDICAID

## 2024-06-10 VITALS
DIASTOLIC BLOOD PRESSURE: 77 MMHG | WEIGHT: 275 LBS | HEART RATE: 73 BPM | SYSTOLIC BLOOD PRESSURE: 125 MMHG | HEIGHT: 75 IN | BODY MASS INDEX: 34.19 KG/M2

## 2024-06-10 DIAGNOSIS — S42.91XA FRACTURE OF RIGHT SHOULDER GIRDLE, PART UNSPECIFIED, INITIAL ENCOUNTER FOR CLOSED FRACTURE: ICD-10-CM

## 2024-06-10 DIAGNOSIS — S43.004A UNSPECIFIED DISLOCATION OF RIGHT SHOULDER JOINT, INITIAL ENCOUNTER: ICD-10-CM

## 2024-06-10 PROCEDURE — 99203 OFFICE O/P NEW LOW 30 MIN: CPT

## 2024-06-10 PROCEDURE — 73030 X-RAY EXAM OF SHOULDER: CPT | Mod: RT

## 2024-06-13 ENCOUNTER — APPOINTMENT (OUTPATIENT)
Dept: CT IMAGING | Facility: CLINIC | Age: 29
End: 2024-06-13
Payer: MEDICAID

## 2024-06-13 PROCEDURE — 73200 CT UPPER EXTREMITY W/O DYE: CPT | Mod: RT

## 2024-06-17 ENCOUNTER — APPOINTMENT (OUTPATIENT)
Dept: ORTHOPEDIC SURGERY | Facility: CLINIC | Age: 29
End: 2024-06-17
Payer: MEDICAID

## 2024-06-17 VITALS
WEIGHT: 275 LBS | BODY MASS INDEX: 34.19 KG/M2 | SYSTOLIC BLOOD PRESSURE: 129 MMHG | HEIGHT: 75 IN | HEART RATE: 75 BPM | DIASTOLIC BLOOD PRESSURE: 77 MMHG

## 2024-06-17 DIAGNOSIS — S42.251D DISPLACED FRACTURE OF GREATER TUBEROSITY OF RIGHT HUMERUS, SUBSEQUENT ENCOUNTER FOR FRACTURE WITH ROUTINE HEALING: ICD-10-CM

## 2024-06-17 DIAGNOSIS — S43.014D ANTERIOR DISLOCATION OF RIGHT HUMERUS, SUBSEQUENT ENCOUNTER: ICD-10-CM

## 2024-06-17 PROCEDURE — 99213 OFFICE O/P EST LOW 20 MIN: CPT

## 2024-07-08 ENCOUNTER — APPOINTMENT (OUTPATIENT)
Dept: ORTHOPEDIC SURGERY | Facility: CLINIC | Age: 29
End: 2024-07-08
Payer: MEDICAID

## 2024-07-08 VITALS — WEIGHT: 275 LBS | HEIGHT: 75 IN | BODY MASS INDEX: 34.19 KG/M2

## 2024-07-08 DIAGNOSIS — S42.251D DISPLACED FRACTURE OF GREATER TUBEROSITY OF RIGHT HUMERUS, SUBSEQUENT ENCOUNTER FOR FRACTURE WITH ROUTINE HEALING: ICD-10-CM

## 2024-07-08 DIAGNOSIS — S43.014D ANTERIOR DISLOCATION OF RIGHT HUMERUS, SUBSEQUENT ENCOUNTER: ICD-10-CM

## 2024-07-08 PROCEDURE — 99213 OFFICE O/P EST LOW 20 MIN: CPT

## 2024-07-08 PROCEDURE — 73030 X-RAY EXAM OF SHOULDER: CPT | Mod: RT

## 2024-07-19 ENCOUNTER — NON-APPOINTMENT (OUTPATIENT)
Age: 29
End: 2024-07-19

## 2024-07-29 NOTE — ED PROVIDER NOTE - SCRIBE NAME
Nutrition Services Brief Update/Event Note Event Note Nutrition Services Off Service Note Amanda Frank

## 2024-08-20 NOTE — PROCEDURAL SAFETY CHECKLIST WITH OR WITHOUT SEDATION - NSSIDESITEMARKD_GEN_ALL_CORE
Confirmed appointment with pt.   ----- Message from Ange Gonzales sent at 8/20/2024  8:52 AM CDT -----  Patient returned Tavia's call. I asked patient if she could come in this week but she said she is in [place] now. Not sure if she can come in this week. She would like a call back at [phone]  
done